# Patient Record
Sex: FEMALE | Race: WHITE | NOT HISPANIC OR LATINO | Employment: FULL TIME | URBAN - METROPOLITAN AREA
[De-identification: names, ages, dates, MRNs, and addresses within clinical notes are randomized per-mention and may not be internally consistent; named-entity substitution may affect disease eponyms.]

---

## 2023-08-30 ENCOUNTER — OFFICE VISIT (OUTPATIENT)
Dept: URGENT CARE | Facility: CLINIC | Age: 28
End: 2023-08-30
Payer: COMMERCIAL

## 2023-08-30 ENCOUNTER — APPOINTMENT (OUTPATIENT)
Dept: RADIOLOGY | Facility: CLINIC | Age: 28
End: 2023-08-30
Payer: COMMERCIAL

## 2023-08-30 VITALS
TEMPERATURE: 98.6 F | DIASTOLIC BLOOD PRESSURE: 78 MMHG | HEIGHT: 70 IN | RESPIRATION RATE: 20 BRPM | SYSTOLIC BLOOD PRESSURE: 118 MMHG | OXYGEN SATURATION: 99 % | WEIGHT: 184.2 LBS | HEART RATE: 82 BPM | BODY MASS INDEX: 26.37 KG/M2

## 2023-08-30 DIAGNOSIS — Z87.09 HISTORY OF ASTHMA: ICD-10-CM

## 2023-08-30 DIAGNOSIS — R50.9 FEVER, UNSPECIFIED FEVER CAUSE: ICD-10-CM

## 2023-08-30 DIAGNOSIS — Z20.828 EXPOSURE TO INFLUENZA: ICD-10-CM

## 2023-08-30 DIAGNOSIS — R05.1 ACUTE COUGH: ICD-10-CM

## 2023-08-30 DIAGNOSIS — R68.89 FLU-LIKE SYMPTOMS: ICD-10-CM

## 2023-08-30 DIAGNOSIS — B34.9 ACUTE VIRAL SYNDROME: Primary | ICD-10-CM

## 2023-08-30 LAB
SARS-COV-2 AG UPPER RESP QL IA: NEGATIVE
VALID CONTROL: NORMAL

## 2023-08-30 PROCEDURE — 87811 SARS-COV-2 COVID19 W/OPTIC: CPT | Performed by: NURSE PRACTITIONER

## 2023-08-30 PROCEDURE — 87636 SARSCOV2 & INF A&B AMP PRB: CPT | Performed by: NURSE PRACTITIONER

## 2023-08-30 PROCEDURE — 71046 X-RAY EXAM CHEST 2 VIEWS: CPT

## 2023-08-30 PROCEDURE — G0383 LEV 4 HOSP TYPE B ED VISIT: HCPCS | Performed by: NURSE PRACTITIONER

## 2023-08-30 RX ORDER — ALBUTEROL SULFATE 90 UG/1
2 AEROSOL, METERED RESPIRATORY (INHALATION) EVERY 6 HOURS PRN
Qty: 8.5 G | Refills: 0 | Status: SHIPPED | OUTPATIENT
Start: 2023-08-30

## 2023-08-30 RX ORDER — ALBUTEROL SULFATE 2.5 MG/3ML
2.5 SOLUTION RESPIRATORY (INHALATION) EVERY 6 HOURS PRN
Qty: 75 ML | Refills: 0 | Status: SHIPPED | OUTPATIENT
Start: 2023-08-30

## 2023-08-30 NOTE — PATIENT INSTRUCTIONS
Your xray was preliminarily read by your provider. A radiologist will read the xray and you will be notified if it is abnormal.    You have a flu test pending. It has been sent to the lab and will take about 48 hours for results. Download [employer] for the results. You will be called if +. Flu is a virus - it is treated symptomatically. Fluids - water, Pedialyte, Gatorade, tylenol, motrin, and rest.   You are to wash your hands well. Your covid was negative. You have been prescribed albuterol MDI and albuterol solution as per your request.  You may take an OTC cough medication - robitiussin, delsym. Follow up with your PCP when you get home  Please mask if you are out in public and are ill with cough, sneezing.   Go to the ED if symptoms worsen    BRAT diet for diarrhea or vomiting - bananas, rice, applesauce and toast.

## 2023-08-30 NOTE — PROGRESS NOTES
[facility]        NAME: Nikki Forbes is a 29 y.o. female  : 1995    MRN: [id number]  DATE: 2023  TIME: 9:39 AM    Assessment and Plan   Acute viral syndrome [B34.9]  1. Acute viral syndrome        2. Flu-like symptoms        3. Acute cough  Poct Covid 19 Rapid Antigen Test    XR chest pa & lateral    Covid/Flu-Office Collect    albuterol (ProAir HFA) 90 mcg/act inhaler    albuterol (2.5 mg/3 mL) 0.083 % nebulizer solution      4. Fever, unspecified fever cause  Poct Covid 19 Rapid Antigen Test    XR chest pa & lateral    Covid/Flu-Office Collect      5. Exposure to influenza  Covid/Flu-Office Collect      6. History of asthma  albuterol (ProAir HFA) 90 mcg/act inhaler    albuterol (2.5 mg/3 mL) 0.083 % nebulizer solution            Patient Instructions       Follow up with PCP in 3-5 days. Proceed to  ER if symptoms worsen. Your xray was preliminarily read by your provider. A radiologist will read the xray and you will be notified if it is abnormal.    You have a flu test pending. It has been sent to the lab and will take about 48 hours for results. Download [employer] for the results. You will be called if +. Flu is a virus - it is treated symptomatically. Fluids - water, Pedialyte, Gatorade, tylenol, motrin, and rest.   You are to wash your hands well. Your covid was negative. You have been prescribed albuterol MDI and albuterol solution as per your request.  You may take an OTC cough medication - robitiussin, delsym. Follow up with your PCP when you get home  Please mask if you are out in public and are ill with cough, sneezing.   Go to the ED if symptoms worsen    BRAT diet for diarrhea or vomiting - bananas, rice, applesauce and toast.            Chief Complaint     Chief Complaint   Patient presents with   • Cough     X 5 days    • Fever     Started yesterday ,took tylenol today    • Generalized Body Aches         History of Present Illness This is a 29year old female who is from [facility] visiting the local area and states that her children tested + for "parainfluenza 2" prior to coming to [place]. Pt states that she now has temp of 100.4, bodyaches, cough - little productive. She states cough x 5 days, fever started yesterday. She has been taking tylenol and motrin. She denies vomiting. + diarrhea. She has not covid tested herself. She states her children were covid tested and were negative. She states she is covid vaccinated. She states she has asthma history and needs her MDI and albuterol solution. Pt denies pregnancy. Review of Systems   Review of Systems   Constitutional: Positive for fever. HENT: Positive for congestion. Negative for sore throat. Eyes: Negative. Respiratory: Positive for cough. Negative for wheezing. Gastrointestinal: Positive for diarrhea. Negative for vomiting. Endocrine: Negative. Genitourinary: Negative. Musculoskeletal: Positive for myalgias. Skin: Negative. Allergic/Immunologic: Negative. Neurological: Negative. Hematological: Negative. Psychiatric/Behavioral: Negative. Current Medications       Current Outpatient Medications:   •  albuterol (2.5 mg/3 mL) 0.083 % nebulizer solution, Take 3 mL (2.5 mg total) by nebulization every 6 (six) hours as needed for wheezing or shortness of breath, Disp: 75 mL, Rfl: 0  •  albuterol (ProAir HFA) 90 mcg/act inhaler, Inhale 2 puffs every 6 (six) hours as needed for wheezing or shortness of breath, Disp: 8.5 g, Rfl: 0    Current Allergies     Allergies as of 08/30/2023   • (No Known Allergies)            The following portions of the patient's history were reviewed and updated as appropriate: allergies, current medications, past family history, past medical history, past social history, past surgical history and problem list.     History reviewed. No pertinent past medical history. History reviewed.  No pertinent surgical history. History reviewed. No pertinent family history. Medications have been verified. Objective   /78   Pulse 82   Temp 98.6 °F (37 °C)   Resp 20   Ht 5' 10" (1.778 m)   Wt 83.6 kg (184 lb 3.2 oz)   SpO2 99%   BMI 26.43 kg/m²   No LMP recorded. Physical Exam     Physical Exam  Vitals and nursing note reviewed. Constitutional:       General: She is not in acute distress. Appearance: Normal appearance. She is normal weight. She is not ill-appearing, toxic-appearing or diaphoretic. HENT:      Head: Normocephalic and atraumatic. Right Ear: Tympanic membrane and ear canal normal.      Left Ear: Tympanic membrane and ear canal normal.   Eyes:      Extraocular Movements: Extraocular movements intact. Cardiovascular:      Rate and Rhythm: Normal rate and regular rhythm. Pulses: Normal pulses. Heart sounds: Normal heart sounds. No murmur heard. Pulmonary:      Effort: Pulmonary effort is normal. No respiratory distress. Breath sounds: Normal breath sounds. No stridor. No wheezing, rhonchi or rales. Comments: No coughing during exam or while in Exam room   Chest:      Chest wall: No tenderness. Abdominal:      General: There is no distension. Tenderness: There is no abdominal tenderness. Musculoskeletal:         General: Normal range of motion. Cervical back: Normal range of motion and neck supple. Skin:     General: Skin is warm and dry. Capillary Refill: Capillary refill takes less than 2 seconds. Neurological:      General: No focal deficit present. Mental Status: She is alert and oriented to person, place, and time. Psychiatric:         Behavior: Behavior normal.         Thought Content:  Thought content normal.         Judgment: Judgment normal.      Comments: Flat affect            Preliminary reading CXR  No acute process seen  Waiting on rad read

## 2023-08-31 LAB
FLUAV RNA RESP QL NAA+PROBE: NEGATIVE
FLUBV RNA RESP QL NAA+PROBE: NEGATIVE
SARS-COV-2 RNA RESP QL NAA+PROBE: NEGATIVE